# Patient Record
Sex: FEMALE | Race: WHITE | NOT HISPANIC OR LATINO | Employment: OTHER | URBAN - METROPOLITAN AREA
[De-identification: names, ages, dates, MRNs, and addresses within clinical notes are randomized per-mention and may not be internally consistent; named-entity substitution may affect disease eponyms.]

---

## 2023-10-09 ENCOUNTER — HOSPITAL ENCOUNTER (OUTPATIENT)
Facility: HOSPITAL | Age: 43
Discharge: HOME | End: 2023-10-09
Attending: STUDENT IN AN ORGANIZED HEALTH CARE EDUCATION/TRAINING PROGRAM | Admitting: STUDENT IN AN ORGANIZED HEALTH CARE EDUCATION/TRAINING PROGRAM
Payer: MEDICAID

## 2023-10-09 ENCOUNTER — APPOINTMENT (OUTPATIENT)
Dept: GENERAL RADIOLOGY | Facility: HOSPITAL | Age: 43
End: 2023-10-09
Payer: MEDICAID

## 2023-10-09 VITALS
TEMPERATURE: 97.5 F | BODY MASS INDEX: 27 KG/M2 | SYSTOLIC BLOOD PRESSURE: 145 MMHG | HEIGHT: 61 IN | DIASTOLIC BLOOD PRESSURE: 66 MMHG | HEART RATE: 85 BPM | WEIGHT: 143 LBS | OXYGEN SATURATION: 100 % | RESPIRATION RATE: 16 BRPM

## 2023-10-09 DIAGNOSIS — S63.502A SPRAIN OF LEFT WRIST, INITIAL ENCOUNTER: Primary | ICD-10-CM

## 2023-10-09 DIAGNOSIS — S50.12XA CONTUSION OF LEFT FOREARM, INITIAL ENCOUNTER: ICD-10-CM

## 2023-10-09 PROCEDURE — G0463 HOSPITAL OUTPT CLINIC VISIT: HCPCS | Performed by: NURSE PRACTITIONER

## 2023-10-09 PROCEDURE — 73090 X-RAY EXAM OF FOREARM: CPT

## 2023-10-09 PROCEDURE — 73110 X-RAY EXAM OF WRIST: CPT

## 2023-10-09 NOTE — FSED PROVIDER NOTE
Subjective   History of Present Illness  The patient is a 43-year-old female who presents to the ER after falling on Saturday while walking up a hill.  Patient complaining of pain in the left forearm and left wrist area.  Patient reports moving her wrist causes increased pain.    History provided by:  Patient   used: No        Review of Systems   Musculoskeletal:         Patient presents with left wrist and left forearm pain after falling while walking up a hill on Saturday.       History reviewed. No pertinent past medical history.    No Known Allergies    No past surgical history on file.    History reviewed. No pertinent family history.    Social History     Socioeconomic History    Marital status:            Objective   Physical Exam  Vitals and nursing note reviewed.   Constitutional:       Appearance: Normal appearance.   HENT:      Head: Normocephalic.      Right Ear: Tympanic membrane, ear canal and external ear normal.      Left Ear: Tympanic membrane, ear canal and external ear normal.      Nose: Nose normal.      Mouth/Throat:      Mouth: Mucous membranes are moist.      Pharynx: Oropharynx is clear.   Eyes:      Extraocular Movements: Extraocular movements intact.      Conjunctiva/sclera: Conjunctivae normal.      Pupils: Pupils are equal, round, and reactive to light.   Abdominal:      General: Bowel sounds are normal.      Palpations: Abdomen is soft.   Musculoskeletal:         General: Normal range of motion.        Arms:       Cervical back: Normal range of motion and neck supple.      Comments: Patient with pain in the left wrist and left forearm. Patient with CMS intact, cap refill less than 3 seconds, radial pulses positive.    Skin:     General: Skin is warm and dry.   Neurological:      General: No focal deficit present.      Mental Status: She is alert and oriented to person, place, and time.   Psychiatric:         Mood and Affect: Mood normal.         Behavior:  Behavior normal. Behavior is cooperative.         Procedures           ED Course  ED Course as of 10/09/23 1013   Mon Oct 09, 2023   1007 XR FOREARM 2 VW LEFT, XR WRIST 3+ VW LEFT     Date of Exam: 10/9/2023 9:50 AM EDT     Indication: injury     Comparison: None available.     Findings:  No acute displaced forearm fracture. Elbow joint alignment appears maintained.     No acute displaced fracture or malalignment within the wrist. No significant degenerative changes. No signs of inflammatory arthritis. Soft tissues are unremarkable.     IMPRESSION:  Impression:  No acute displaced fracture or joint malalignment.      [DS]      ED Course User Index  [DS] Annette Sims APRN                                           Medical Decision Making  Patient with pain in the left wrist and left forearm. Patient with CMS intact, cap refill less than 3 seconds, radial pulses positive.  Patient reports she fell going up a hill on Saturday which was causing the left wrist and forearm pain.  Patient reports today does have increased pain with movement of the wrist.  Patient with minimal swelling, no bruising noted.    X-ray of the left wrist left forearm are unremarkable at this time.      Amount and/or Complexity of Data Reviewed  Radiology: ordered. Decision-making details documented in ED Course.    Risk  OTC drugs.        Final diagnoses:   Sprain of left wrist, initial encounter   Contusion of left forearm, initial encounter       ED Disposition  ED Disposition       ED Disposition   Discharge    Condition   Stable    Comment   --               Prabha Almonte MD  [address] [facility] [place] [address] [phone]    Schedule an appointment as soon as possible for a visit in 1 week  As needed, If symptoms worsen    Martin Zayas MD  [address] [facility] [place] [address] [phone]    Schedule an appointment as soon as possible for a visit in 1 week  orthopedic physcian you can follow up with if needed.,  As needed, If symptoms worsen         Medication List      No changes were made to your prescriptions during this visit.

## 2023-10-09 NOTE — DISCHARGE INSTRUCTIONS
Follow-up with primary care for further evaluation and treatment.  You may also follow-up with orthopedics, Dr. Zayas.     Ice and elevation will help with pain and swelling.    Tylenol/Motrin as needed for pain/fevers    You can also purchase you a elastic wrist brace from [employer] or [employer] place that will help with support.

## 2024-01-24 ENCOUNTER — TRANSCRIBE ORDERS (OUTPATIENT)
Dept: ADMINISTRATIVE | Facility: HOSPITAL | Age: 44
End: 2024-01-24
Payer: MEDICAID

## 2024-01-24 DIAGNOSIS — R10.11 RIGHT UPPER QUADRANT PAIN: Primary | ICD-10-CM

## 2024-11-04 ENCOUNTER — HOSPITAL ENCOUNTER (OUTPATIENT)
Facility: HOSPITAL | Age: 44
Discharge: HOME | End: 2024-11-04
Attending: EMERGENCY MEDICINE | Admitting: EMERGENCY MEDICINE
Payer: MEDICAID

## 2024-11-04 VITALS
HEIGHT: 61 IN | RESPIRATION RATE: 16 BRPM | HEART RATE: 93 BPM | BODY MASS INDEX: 29 KG/M2 | OXYGEN SATURATION: 99 % | DIASTOLIC BLOOD PRESSURE: 66 MMHG | WEIGHT: 153.6 LBS | TEMPERATURE: 98.6 F | SYSTOLIC BLOOD PRESSURE: 145 MMHG

## 2024-11-04 DIAGNOSIS — T78.40XA RASH DUE TO ALLERGY: Primary | ICD-10-CM

## 2024-11-04 DIAGNOSIS — R21 RASH DUE TO ALLERGY: Primary | ICD-10-CM

## 2024-11-04 LAB
FLUAV SUBTYP SPEC NAA+PROBE: NOT DETECTED
FLUBV RNA ISLT QL NAA+PROBE: NOT DETECTED
SARS-COV-2 RNA RESP QL NAA+PROBE: NOT DETECTED
STREP A PCR: NOT DETECTED

## 2024-11-04 PROCEDURE — 87651 STREP A DNA AMP PROBE: CPT | Performed by: EMERGENCY MEDICINE

## 2024-11-04 PROCEDURE — G0463 HOSPITAL OUTPT CLINIC VISIT: HCPCS | Performed by: NURSE PRACTITIONER

## 2024-11-04 PROCEDURE — 87636 SARSCOV2 & INF A&B AMP PRB: CPT | Performed by: EMERGENCY MEDICINE

## 2024-11-04 RX ORDER — HYDROXYZINE PAMOATE 50 MG/1
50 CAPSULE ORAL 3 TIMES DAILY PRN
Qty: 21 CAPSULE | Refills: 0 | Status: SHIPPED | OUTPATIENT
Start: 2024-11-04 | End: 2024-11-11

## 2024-11-04 RX ORDER — PREDNISONE 20 MG/1
40 TABLET ORAL DAILY
Qty: 14 TABLET | Refills: 0 | Status: SHIPPED | OUTPATIENT
Start: 2024-11-04 | End: 2024-11-11

## 2024-11-04 NOTE — DISCHARGE INSTRUCTIONS
Call for a follow up appointment with your primary care for further evaluation and treatment, possible referral to dermatology     Medications as prescribed, do not take benadryl and hydroxyzine, take one or the other.     Tylenol/Motrin as needed for pain/fevers    Make sure patient is drinking plenty of fluids.    Return for any new or worsening symptoms.      If you have increased fevers that do not respond to Tylenol or Motrin, if you develop nausea, vomiting  then you should be reevaluated.

## 2024-11-04 NOTE — FSED PROVIDER NOTE
Subjective   History of Present Illness  The patient is a 44-year-old female who presents the ER with a sore throat, rash.  Patient reports rash started on Friday.  Sunday became worse.  Patient has multiple single areas of rash that actually look like insect bite    History provided by:  Patient   used: No        Review of Systems   HENT:  Positive for sore throat.    Skin:  Positive for rash.       No past medical history on file.    No Known Allergies    No past surgical history on file.    No family history on file.    Social History     Socioeconomic History    Marital status:            Objective   Physical Exam  Vitals and nursing note reviewed.   Constitutional:       Appearance: Normal appearance. She is well-developed.   HENT:      Head: Normocephalic.      Right Ear: Tympanic membrane and ear canal normal.      Left Ear: Tympanic membrane and ear canal normal.      Nose: Nose normal.      Mouth/Throat:      Lips: Pink.      Mouth: Mucous membranes are moist.      Pharynx: Oropharynx is clear. Uvula midline.   Eyes:      Conjunctiva/sclera: Conjunctivae normal.      Pupils: Pupils are equal, round, and reactive to light.   Cardiovascular:      Rate and Rhythm: Normal rate and regular rhythm.      Pulses: Normal pulses.      Heart sounds: Normal heart sounds.   Pulmonary:      Effort: Pulmonary effort is normal.      Breath sounds: Normal breath sounds and air entry.   Abdominal:      General: Bowel sounds are normal.      Palpations: Abdomen is soft.   Musculoskeletal:         General: Normal range of motion.      Cervical back: Full passive range of motion without pain, normal range of motion and neck supple.   Skin:     General: Skin is warm and dry.      Comments: Patient with scattered multiple red areas which resemble insect bites to her arms, abdomen, back area.  Patient reports they do itch.    Neurological:      General: No focal deficit present.      Mental Status: She  is alert and oriented to person, place, and time.   Psychiatric:         Mood and Affect: Mood normal.         Behavior: Behavior normal. Behavior is cooperative.         Procedures           ED Course  ED Course as of 11/04/24 1718   Mon Nov 04, 2024   1556 COVID19: Not Detected [DS]   1556 Influenza A PCR: Not Detected [DS]   1556 Influenza B PCR: Not Detected [DS]   1556 STREP A PCR: Not Detected [DS]      ED Course User Index  [DS] Annette Sims APRN                                           Medical Decision Making  The patient is a 44-year-old female who presents the ER with a sore throat, rash.  Patient reports rash started on Friday.  Sunday became worse.  Patient has multiple single areas of rash that actually look like insect bite    Patient is negative for strep.      This patient who presents with rash since Friday,  consistent with insect bites. History and exam findings not consistent with dangerous etiologies of rash such as SJS/TEN, or secondary dangerous causes such as petechial rashes from thrombocytopenia or rickettsial infections. no signs of anaphylaxis either.  Patient reports that the areas do itch. Plan at this time is to treat symptomatically, instruct to follow up with PCP or derm PRN.    Problems Addressed:  Rash due to allergy: complicated acute illness or injury    Amount and/or Complexity of Data Reviewed  Labs:  Decision-making details documented in ED Course.    Risk  Prescription drug management.        Final diagnoses:   Rash due to allergy       ED Disposition  ED Disposition       ED Disposition   Discharge    Condition   Stable    Comment   --               Prabha Almonte MD  [address] [facility] [place] [facility]  [phone]    Schedule an appointment as soon as possible for a visit in 1 week  As needed, If symptoms worsen         Medication List        New Prescriptions      hydrOXYzine pamoate 50 MG capsule  Commonly known as: VISTARIL  Take 1 capsule by mouth 3  (Three) Times a Day As Needed for Itching for up to 7 days.     predniSONE 20 MG tablet  Commonly known as: DELTASONE  Take 2 tablets by mouth Daily for 7 days.               Where to Get Your Medications        These medications were sent to [facility] [id number] - [place], [place] - [address] [facility] - [phone]  - [phone] [address] [place] [address]      Phone: [phone]   hydrOXYzine pamoate 50 MG capsule  predniSONE 20 MG tablet